# Patient Record
Sex: MALE | Race: WHITE | Employment: FULL TIME | URBAN - METROPOLITAN AREA
[De-identification: names, ages, dates, MRNs, and addresses within clinical notes are randomized per-mention and may not be internally consistent; named-entity substitution may affect disease eponyms.]

---

## 2024-04-22 ENCOUNTER — HOSPITAL ENCOUNTER (INPATIENT)
Age: 49
LOS: 3 days | Discharge: HOME | DRG: 062 | End: 2024-04-25
Attending: EMERGENCY MEDICINE | Admitting: PSYCHIATRY & NEUROLOGY
Payer: COMMERCIAL

## 2024-04-22 ENCOUNTER — APPOINTMENT (OUTPATIENT)
Dept: CT IMAGING | Age: 49
DRG: 062 | End: 2024-04-22
Payer: COMMERCIAL

## 2024-04-22 DIAGNOSIS — I63.9 ISCHEMIC STROKE (HCC): ICD-10-CM

## 2024-04-22 DIAGNOSIS — I63.9 CEREBROVASCULAR ACCIDENT (CVA), UNSPECIFIED MECHANISM (HCC): Primary | ICD-10-CM

## 2024-04-22 LAB
ANION GAP SERPL CALCULATED.3IONS-SCNC: 12 MMOL/L (ref 9–16)
BASOPHILS # BLD: 0.03 K/UL (ref 0–0.2)
BASOPHILS NFR BLD: 1 % (ref 0–2)
BUN BLD-MCNC: 18 MG/DL (ref 8–26)
BUN SERPL-MCNC: 18 MG/DL (ref 6–20)
CA-I BLD-SCNC: 1.22 MMOL/L (ref 1.15–1.33)
CALCIUM SERPL-MCNC: 9.2 MG/DL (ref 8.6–10.4)
CHLORIDE BLD-SCNC: 107 MMOL/L (ref 98–107)
CHLORIDE SERPL-SCNC: 106 MMOL/L (ref 98–107)
CK SERPL-CCNC: 130 U/L (ref 39–308)
CO2 BLD CALC-SCNC: 26 MMOL/L (ref 22–30)
CO2 SERPL-SCNC: 22 MMOL/L (ref 20–31)
CREAT SERPL-MCNC: 0.9 MG/DL (ref 0.7–1.2)
EGFR, POC: >90 ML/MIN/1.73M2
EOSINOPHIL # BLD: 0.11 K/UL (ref 0–0.44)
EOSINOPHILS RELATIVE PERCENT: 2 % (ref 1–4)
ERYTHROCYTE [DISTWIDTH] IN BLOOD BY AUTOMATED COUNT: 12.2 % (ref 11.8–14.4)
GFR SERPL CREATININE-BSD FRML MDRD: >90 ML/MIN/1.73M2
GLUCOSE BLD-MCNC: 94 MG/DL (ref 74–100)
GLUCOSE SERPL-MCNC: 100 MG/DL (ref 74–99)
HCO3 VENOUS: 26.5 MMOL/L (ref 22–29)
HCT VFR BLD AUTO: 42.3 % (ref 40.7–50.3)
HCT VFR BLD AUTO: 44 % (ref 41–53)
HGB BLD-MCNC: 14.5 G/DL (ref 13–17)
IMM GRANULOCYTES # BLD AUTO: <0.03 K/UL (ref 0–0.3)
IMM GRANULOCYTES NFR BLD: 0 %
INR PPP: 1
LYMPHOCYTES NFR BLD: 1.66 K/UL (ref 1.1–3.7)
LYMPHOCYTES RELATIVE PERCENT: 34 % (ref 24–43)
MCH RBC QN AUTO: 32.2 PG (ref 25.2–33.5)
MCHC RBC AUTO-ENTMCNC: 34.3 G/DL (ref 28.4–34.8)
MCV RBC AUTO: 93.8 FL (ref 82.6–102.9)
MONOCYTES NFR BLD: 0.46 K/UL (ref 0.1–1.2)
MONOCYTES NFR BLD: 9 % (ref 3–12)
MYOGLOBIN SERPL-MCNC: 35 NG/ML (ref 28–72)
NEUTROPHILS NFR BLD: 54 % (ref 36–65)
NEUTS SEG NFR BLD: 2.69 K/UL (ref 1.5–8.1)
NRBC BLD-RTO: 0 PER 100 WBC
O2 SAT, VEN: 51.6 % (ref 60–85)
PARTIAL THROMBOPLASTIN TIME: 24.6 SEC (ref 23–36.5)
PCO2, VEN: 45 MM HG (ref 41–51)
PH VENOUS: 7.38 (ref 7.32–7.43)
PLATELET # BLD AUTO: 201 K/UL (ref 138–453)
PMV BLD AUTO: 10.1 FL (ref 8.1–13.5)
PO2, VEN: 28.3 MM HG (ref 30–50)
POC ANION GAP: 11 MMOL/L (ref 7–16)
POC CREATININE: 0.8 MG/DL (ref 0.51–1.19)
POC HEMOGLOBIN (CALC): 14.9 G/DL (ref 13.5–17.5)
POC LACTIC ACID: 0.5 MMOL/L (ref 0.56–1.39)
POSITIVE BASE EXCESS, VEN: 0.9 MMOL/L (ref 0–3)
POTASSIUM BLD-SCNC: 3.8 MMOL/L (ref 3.5–4.5)
POTASSIUM SERPL-SCNC: 3.8 MMOL/L (ref 3.7–5.3)
PROTHROMBIN TIME: 13.1 SEC (ref 11.7–14.9)
RBC # BLD AUTO: 4.51 M/UL (ref 4.21–5.77)
SODIUM BLD-SCNC: 143 MMOL/L (ref 138–146)
SODIUM SERPL-SCNC: 140 MMOL/L (ref 136–145)
TROPONIN I SERPL HS-MCNC: <6 NG/L (ref 0–22)
WBC OTHER # BLD: 5 K/UL (ref 3.5–11.3)

## 2024-04-22 PROCEDURE — 83605 ASSAY OF LACTIC ACID: CPT

## 2024-04-22 PROCEDURE — 96374 THER/PROPH/DIAG INJ IV PUSH: CPT

## 2024-04-22 PROCEDURE — 70496 CT ANGIOGRAPHY HEAD: CPT

## 2024-04-22 PROCEDURE — 83874 ASSAY OF MYOGLOBIN: CPT

## 2024-04-22 PROCEDURE — 82947 ASSAY GLUCOSE BLOOD QUANT: CPT

## 2024-04-22 PROCEDURE — 82553 CREATINE MB FRACTION: CPT

## 2024-04-22 PROCEDURE — 85025 COMPLETE CBC W/AUTO DIFF WBC: CPT

## 2024-04-22 PROCEDURE — 99223 1ST HOSP IP/OBS HIGH 75: CPT | Performed by: PSYCHIATRY & NEUROLOGY

## 2024-04-22 PROCEDURE — 82565 ASSAY OF CREATININE: CPT

## 2024-04-22 PROCEDURE — 84520 ASSAY OF UREA NITROGEN: CPT

## 2024-04-22 PROCEDURE — 93005 ELECTROCARDIOGRAM TRACING: CPT

## 2024-04-22 PROCEDURE — 3E03317 INTRODUCTION OF OTHER THROMBOLYTIC INTO PERIPHERAL VEIN, PERCUTANEOUS APPROACH: ICD-10-PCS | Performed by: PSYCHIATRY & NEUROLOGY

## 2024-04-22 PROCEDURE — 70450 CT HEAD/BRAIN W/O DYE: CPT

## 2024-04-22 PROCEDURE — 85610 PROTHROMBIN TIME: CPT

## 2024-04-22 PROCEDURE — B24BZZ4 ULTRASONOGRAPHY OF HEART WITH AORTA, TRANSESOPHAGEAL: ICD-10-PCS | Performed by: INTERNAL MEDICINE

## 2024-04-22 PROCEDURE — 82330 ASSAY OF CALCIUM: CPT

## 2024-04-22 PROCEDURE — 82550 ASSAY OF CK (CPK): CPT

## 2024-04-22 PROCEDURE — 6370000000 HC RX 637 (ALT 250 FOR IP)

## 2024-04-22 PROCEDURE — 84484 ASSAY OF TROPONIN QUANT: CPT

## 2024-04-22 PROCEDURE — 2580000003 HC RX 258

## 2024-04-22 PROCEDURE — 80051 ELECTROLYTE PANEL: CPT

## 2024-04-22 PROCEDURE — 82803 BLOOD GASES ANY COMBINATION: CPT

## 2024-04-22 PROCEDURE — 6360000004 HC RX CONTRAST MEDICATION

## 2024-04-22 PROCEDURE — 85014 HEMATOCRIT: CPT

## 2024-04-22 PROCEDURE — 80048 BASIC METABOLIC PNL TOTAL CA: CPT

## 2024-04-22 PROCEDURE — 6360000002 HC RX W HCPCS

## 2024-04-22 PROCEDURE — 85730 THROMBOPLASTIN TIME PARTIAL: CPT

## 2024-04-22 PROCEDURE — 2000000000 HC ICU R&B

## 2024-04-22 PROCEDURE — 99285 EMERGENCY DEPT VISIT HI MDM: CPT

## 2024-04-22 RX ORDER — SODIUM CHLORIDE 0.9 % (FLUSH) 0.9 %
5-40 SYRINGE (ML) INJECTION PRN
Status: DISCONTINUED | OUTPATIENT
Start: 2024-04-22 | End: 2024-04-25 | Stop reason: HOSPADM

## 2024-04-22 RX ORDER — SODIUM CHLORIDE 0.9 % (FLUSH) 0.9 %
5-40 SYRINGE (ML) INJECTION EVERY 12 HOURS SCHEDULED
Status: DISCONTINUED | OUTPATIENT
Start: 2024-04-22 | End: 2024-04-25 | Stop reason: HOSPADM

## 2024-04-22 RX ORDER — SODIUM CHLORIDE 9 MG/ML
INJECTION, SOLUTION INTRAVENOUS PRN
Status: DISCONTINUED | OUTPATIENT
Start: 2024-04-22 | End: 2024-04-25 | Stop reason: HOSPADM

## 2024-04-22 RX ORDER — ROSUVASTATIN CALCIUM 20 MG/1
40 TABLET, COATED ORAL NIGHTLY
Status: DISCONTINUED | OUTPATIENT
Start: 2024-04-22 | End: 2024-04-25 | Stop reason: HOSPADM

## 2024-04-22 RX ORDER — SODIUM CHLORIDE 0.9 % (FLUSH) 0.9 %
10 SYRINGE (ML) INJECTION ONCE
Status: DISCONTINUED | OUTPATIENT
Start: 2024-04-22 | End: 2024-04-25 | Stop reason: HOSPADM

## 2024-04-22 RX ORDER — ONDANSETRON 2 MG/ML
4 INJECTION INTRAMUSCULAR; INTRAVENOUS EVERY 6 HOURS PRN
Status: DISCONTINUED | OUTPATIENT
Start: 2024-04-22 | End: 2024-04-25 | Stop reason: HOSPADM

## 2024-04-22 RX ORDER — ENOXAPARIN SODIUM 100 MG/ML
30 INJECTION SUBCUTANEOUS 2 TIMES DAILY
Status: DISCONTINUED | OUTPATIENT
Start: 2024-04-23 | End: 2024-04-25 | Stop reason: HOSPADM

## 2024-04-22 RX ORDER — ASPIRIN 300 MG/1
300 SUPPOSITORY RECTAL DAILY
Status: DISCONTINUED | OUTPATIENT
Start: 2024-04-23 | End: 2024-04-24

## 2024-04-22 RX ORDER — POLYETHYLENE GLYCOL 3350 17 G/17G
17 POWDER, FOR SOLUTION ORAL DAILY PRN
Status: DISCONTINUED | OUTPATIENT
Start: 2024-04-22 | End: 2024-04-25 | Stop reason: HOSPADM

## 2024-04-22 RX ORDER — ASPIRIN 81 MG/1
81 TABLET, CHEWABLE ORAL DAILY
Status: DISCONTINUED | OUTPATIENT
Start: 2024-04-23 | End: 2024-04-24

## 2024-04-22 RX ORDER — ONDANSETRON 4 MG/1
4 TABLET, ORALLY DISINTEGRATING ORAL EVERY 8 HOURS PRN
Status: DISCONTINUED | OUTPATIENT
Start: 2024-04-22 | End: 2024-04-25 | Stop reason: HOSPADM

## 2024-04-22 RX ADMIN — IOPAMIDOL 90 ML: 755 INJECTION, SOLUTION INTRAVENOUS at 17:36

## 2024-04-22 RX ADMIN — Medication 25 MG: at 17:44

## 2024-04-22 RX ADMIN — SODIUM CHLORIDE, PRESERVATIVE FREE 5 ML: 5 INJECTION INTRAVENOUS at 23:11

## 2024-04-22 RX ADMIN — ROSUVASTATIN CALCIUM 40 MG: 20 TABLET, FILM COATED ORAL at 23:22

## 2024-04-22 ASSESSMENT — PAIN - FUNCTIONAL ASSESSMENT: PAIN_FUNCTIONAL_ASSESSMENT: NONE - DENIES PAIN

## 2024-04-22 NOTE — ED NOTES
Pt arrives to ED 22 via EMS.  EMS called with a RACE score of 6.   Pt states he was at the gas station, when he began to feel weird and weak on the R side.  Pt states his coworkers caught him, so he did not fall.   Pt denies being on any blood thinners.  Denies any pain or any other complaints at this time.  Pt straight to the CT scanner on arrival.

## 2024-04-22 NOTE — ED PROVIDER NOTES
[facility]     Emergency Department     Faculty Attestation    I performed a history and physical examination of the patient and discussed management with the resident. I reviewed the resident’s note and agree with the documented findings and plan of care. Any areas of disagreement are noted on the chart. I was personally present for the key portions of any procedures. I have documented in the chart those procedures where I was not present during the key portions. I have reviewed the emergency nurses triage note. I agree with the chief complaint, past medical history, past surgical history, allergies, medications, social and family history as documented unless otherwise noted below.        For Physician Assistant/ Nurse Practitioner cases/documentation I have personally evaluated this patient and have completed at least one if not all key elements of the E/M (history, physical exam, and MDM). Additional findings are as noted.  I have personally seen and evaluated the patient.  I find the patient's history and physical exam are consistent with the NP/PA documentation.  I agree with the care provided, treatment rendered, disposition and follow-up plan.    Arrives by EMS last known well 20 minutes prior to arrival here acute onset of right-sided weakness of arm and leg.  Patient has a family history of strokes he himself has no medical issues or problems or complaints.,,  Blood pressure in the field was 142 systolic blood sugar normal    Stroke alert critical called upon arrival stroke team arrives this patient is led to the CT scanner immediately from the EMS back          Critical Care     Carlos Alberto Moreno M.D.  Attending Emergency  Physician            Carlos Alberto Moreno MD  04/22/24 [id number]

## 2024-04-22 NOTE — ED PROVIDER NOTES
[facility]  Emergency Department Encounter  Emergency Medicine Resident     Pt Name:Jordi Lott  MRN: [id number]  Birthdate 1975  Date of evaluation: 4/22/24  PCP:  Gerry Sarabia DO  Note Started: 5:30 PM EDT      CHIEF COMPLAINT       Chief Complaint   Patient presents with    Cerebrovascular Accident       HISTORY OF PRESENT ILLNESS  (Location/Symptom, Timing/Onset, Context/Setting, Quality, Duration, Modifying Factors, Severity.)      Jordi Lott is a 48 y.o. male who presents with right-sided deficits that began just prior to arrival.  Patient presents as a race alert from EMS.  Patient was out with friends when they noticed the patient began having right-sided deficits including facial droop, right arm weakness, right lower extremity weakness.  EMS was called.  Patient also has speech deficits.  Patient voices no significant past medical history.  Difficulty obtaining history due to patient's speech      PAST MEDICAL / SURGICAL / SOCIAL / FAMILY HISTORY      has no past medical history on file.       has no past surgical history on file.      Social History     Socioeconomic History    Marital status:      Spouse name: Not on file    Number of children: Not on file    Years of education: Not on file    Highest education level: Not on file   Occupational History    Not on file   Tobacco Use    Smoking status: Not on file    Smokeless tobacco: Not on file   Substance and Sexual Activity    Alcohol use: Not on file    Drug use: Not on file    Sexual activity: Not on file   Other Topics Concern    Not on file   Social History Narrative    Not on file     Social Determinants of Health     Financial Resource Strain: Not on file   Food Insecurity: No Food Insecurity (4/23/2024)    Hunger Vital Sign     Worried About Running Out of Food in the Last Year: Never true     Ran Out of Food in the Last Year: Never true   Transportation Needs: No Transportation Needs (4/23/2024)    PRAPARE - Transportation

## 2024-04-23 ENCOUNTER — APPOINTMENT (OUTPATIENT)
Age: 49
DRG: 062 | End: 2024-04-23
Attending: INTERNAL MEDICINE
Payer: COMMERCIAL

## 2024-04-23 ENCOUNTER — APPOINTMENT (OUTPATIENT)
Dept: CT IMAGING | Age: 49
DRG: 062 | End: 2024-04-23
Payer: COMMERCIAL

## 2024-04-23 ENCOUNTER — APPOINTMENT (OUTPATIENT)
Dept: MRI IMAGING | Age: 49
DRG: 062 | End: 2024-04-23
Payer: COMMERCIAL

## 2024-04-23 PROBLEM — G81.91 ACUTE RIGHT HEMIPARESIS (HCC): Status: ACTIVE | Noted: 2024-04-23

## 2024-04-23 PROBLEM — I63.512 ACUTE ISCHEMIC LEFT MIDDLE CEREBRAL ARTERY (MCA) STROKE (HCC): Status: ACTIVE | Noted: 2024-04-22

## 2024-04-23 LAB
AMPHET UR QL SCN: NEGATIVE
ANION GAP SERPL CALCULATED.3IONS-SCNC: 10 MMOL/L (ref 9–16)
BARBITURATES UR QL SCN: NEGATIVE
BENZODIAZ UR QL: NEGATIVE
BUN SERPL-MCNC: 15 MG/DL (ref 6–20)
CALCIUM SERPL-MCNC: 8.6 MG/DL (ref 8.6–10.4)
CANNABINOIDS UR QL SCN: NEGATIVE
CHLORIDE SERPL-SCNC: 109 MMOL/L (ref 98–107)
CHOLEST SERPL-MCNC: 155 MG/DL (ref 0–199)
CHOLESTEROL/HDL RATIO: 4
CO2 SERPL-SCNC: 21 MMOL/L (ref 20–31)
COCAINE UR QL SCN: NEGATIVE
CREAT SERPL-MCNC: 0.8 MG/DL (ref 0.7–1.2)
EKG ATRIAL RATE: 65 BPM
EKG P AXIS: 19 DEGREES
EKG P-R INTERVAL: 176 MS
EKG Q-T INTERVAL: 414 MS
EKG QRS DURATION: 94 MS
EKG QTC CALCULATION (BAZETT): 430 MS
EKG R AXIS: -27 DEGREES
EKG T AXIS: 5 DEGREES
EKG VENTRICULAR RATE: 65 BPM
ERYTHROCYTE [DISTWIDTH] IN BLOOD BY AUTOMATED COUNT: 12.2 % (ref 11.8–14.4)
EST. AVERAGE GLUCOSE BLD GHB EST-MCNC: 94 MG/DL
FENTANYL UR QL: NEGATIVE
GFR SERPL CREATININE-BSD FRML MDRD: >90 ML/MIN/1.73M2
GLUCOSE SERPL-MCNC: 111 MG/DL (ref 74–99)
HBA1C MFR BLD: 4.9 % (ref 4–6)
HCT VFR BLD AUTO: 41.4 % (ref 40.7–50.3)
HDLC SERPL-MCNC: 44 MG/DL
HGB BLD-MCNC: 14.2 G/DL (ref 13–17)
LDLC SERPL CALC-MCNC: 95 MG/DL (ref 0–100)
MCH RBC QN AUTO: 32 PG (ref 25.2–33.5)
MCHC RBC AUTO-ENTMCNC: 34.3 G/DL (ref 28.4–34.8)
MCV RBC AUTO: 93.2 FL (ref 82.6–102.9)
METHADONE UR QL: NEGATIVE
NRBC BLD-RTO: 0 PER 100 WBC
OPIATES UR QL SCN: NEGATIVE
OXYCODONE UR QL SCN: NEGATIVE
PCP UR QL SCN: NEGATIVE
PLATELET # BLD AUTO: 150 K/UL (ref 138–453)
PMV BLD AUTO: 10 FL (ref 8.1–13.5)
POTASSIUM SERPL-SCNC: 3.9 MMOL/L (ref 3.7–5.3)
RBC # BLD AUTO: 4.44 M/UL (ref 4.21–5.77)
SODIUM SERPL-SCNC: 140 MMOL/L (ref 136–145)
TEST INFORMATION: NORMAL
TRIGL SERPL-MCNC: 83 MG/DL
VLDLC SERPL CALC-MCNC: 17 MG/DL
WBC OTHER # BLD: 4.2 K/UL (ref 3.5–11.3)

## 2024-04-23 PROCEDURE — 85385 FIBRINOGEN ANTIGEN: CPT

## 2024-04-23 PROCEDURE — 83036 HEMOGLOBIN GLYCOSYLATED A1C: CPT

## 2024-04-23 PROCEDURE — 70551 MRI BRAIN STEM W/O DYE: CPT

## 2024-04-23 PROCEDURE — 86147 CARDIOLIPIN ANTIBODY EA IG: CPT

## 2024-04-23 PROCEDURE — 85302 CLOT INHIBIT PROT C ANTIGEN: CPT

## 2024-04-23 PROCEDURE — 80307 DRUG TEST PRSMV CHEM ANLYZR: CPT

## 2024-04-23 PROCEDURE — 36415 COLL VENOUS BLD VENIPUNCTURE: CPT

## 2024-04-23 PROCEDURE — 97165 OT EVAL LOW COMPLEX 30 MIN: CPT

## 2024-04-23 PROCEDURE — 80061 LIPID PANEL: CPT

## 2024-04-23 PROCEDURE — 2580000003 HC RX 258

## 2024-04-23 PROCEDURE — 86148 ANTI-PHOSPHOLIPID ANTIBODY: CPT

## 2024-04-23 PROCEDURE — 2060000000 HC ICU INTERMEDIATE R&B

## 2024-04-23 PROCEDURE — 85240 CLOT FACTOR VIII AHG 1 STAGE: CPT

## 2024-04-23 PROCEDURE — 86140 C-REACTIVE PROTEIN: CPT

## 2024-04-23 PROCEDURE — 86146 BETA-2 GLYCOPROTEIN ANTIBODY: CPT

## 2024-04-23 PROCEDURE — 83695 ASSAY OF LIPOPROTEIN(A): CPT

## 2024-04-23 PROCEDURE — 85230 CLOT FACTOR VII PROCONVERTIN: CPT

## 2024-04-23 PROCEDURE — 97162 PT EVAL MOD COMPLEX 30 MIN: CPT

## 2024-04-23 PROCEDURE — 83090 ASSAY OF HOMOCYSTEINE: CPT

## 2024-04-23 PROCEDURE — 85384 FIBRINOGEN ACTIVITY: CPT

## 2024-04-23 PROCEDURE — 85300 ANTITHROMBIN III ACTIVITY: CPT

## 2024-04-23 PROCEDURE — 85027 COMPLETE CBC AUTOMATED: CPT

## 2024-04-23 PROCEDURE — 70450 CT HEAD/BRAIN W/O DYE: CPT

## 2024-04-23 PROCEDURE — 85610 PROTHROMBIN TIME: CPT

## 2024-04-23 PROCEDURE — 81291 MTHFR GENE: CPT

## 2024-04-23 PROCEDURE — 85613 RUSSELL VIPER VENOM DILUTED: CPT

## 2024-04-23 PROCEDURE — 97116 GAIT TRAINING THERAPY: CPT

## 2024-04-23 PROCEDURE — 85306 CLOT INHIBIT PROT S FREE: CPT

## 2024-04-23 PROCEDURE — 97535 SELF CARE MNGMENT TRAINING: CPT

## 2024-04-23 PROCEDURE — 81241 F5 GENE: CPT

## 2024-04-23 PROCEDURE — 80048 BASIC METABOLIC PNL TOTAL CA: CPT

## 2024-04-23 PROCEDURE — 6370000000 HC RX 637 (ALT 250 FOR IP)

## 2024-04-23 PROCEDURE — 81240 F2 GENE: CPT

## 2024-04-23 PROCEDURE — 93010 ELECTROCARDIOGRAM REPORT: CPT | Performed by: INTERNAL MEDICINE

## 2024-04-23 PROCEDURE — 85730 THROMBOPLASTIN TIME PARTIAL: CPT

## 2024-04-23 PROCEDURE — 85307 ASSAY ACTIVATED PROTEIN C: CPT

## 2024-04-23 RX ORDER — TRAZODONE HYDROCHLORIDE 100 MG/1
100 TABLET ORAL NIGHTLY
COMMUNITY
Start: 2018-10-19

## 2024-04-23 RX ORDER — TRAZODONE HYDROCHLORIDE 100 MG/1
100 TABLET ORAL NIGHTLY
Status: DISCONTINUED | OUTPATIENT
Start: 2024-04-23 | End: 2024-04-25 | Stop reason: HOSPADM

## 2024-04-23 RX ADMIN — SODIUM CHLORIDE, PRESERVATIVE FREE 10 ML: 5 INJECTION INTRAVENOUS at 20:28

## 2024-04-23 RX ADMIN — SODIUM CHLORIDE, PRESERVATIVE FREE 10 ML: 5 INJECTION INTRAVENOUS at 09:10

## 2024-04-23 RX ADMIN — TRAZODONE HYDROCHLORIDE 100 MG: 100 TABLET ORAL at 22:00

## 2024-04-23 RX ADMIN — ROSUVASTATIN CALCIUM 40 MG: 20 TABLET, FILM COATED ORAL at 20:28

## 2024-04-23 RX ADMIN — SODIUM CHLORIDE, PRESERVATIVE FREE 10 ML: 5 INJECTION INTRAVENOUS at 09:09

## 2024-04-23 ASSESSMENT — ENCOUNTER SYMPTOMS
BACK PAIN: 0
SHORTNESS OF BREATH: 0
ABDOMINAL PAIN: 0
NAUSEA: 0

## 2024-04-23 NOTE — ED NOTES
Report given to BERNARD Martin  All questions answered  Pt to floor via stretcher on RA and full cardiac monitor with RN

## 2024-04-23 NOTE — ED NOTES
Admitting service perfect served about bradycardic when pt asleep. Neuro resident forward message to crit care neuro team

## 2024-04-23 NOTE — ED NOTES
ED to inpatient nurses report      Chief Complaint:  Chief Complaint   Patient presents with    Cerebrovascular Accident     Present to ED from: Home    MOA:     LOC: alert and orientated to name, place, date  Mobility: Requires assistance * 1  Oxygen Baseline: 98%    Current needs required: None   Pending ED orders: None  Present condition: A&O x 4    Why did the patient come to the ED? Pt arrives to ED 22 via EMS.  EMS called with a RACE score of 6.   Pt states he was at the gas station, when he began to feel weird and weak on the R side.  Pt states his coworkers caught him, so he did not fall.   Pt denies being on any blood thinners.  Denies any pain or any other complaints at this time.  Pt straight to the CT scanner on arrival.  What is the plan? Monitor after TNK  Any procedures or intervention occur? CT, TNK, labs  Any safety concerns: Risk for bleeding    Mental Status:  Level of Consciousness: Alert (0)    Psych Assessment:   Psychosocial  Psychosocial (WDL): Within Defined Limits  Vital signs   Vitals:    04/23/24 0212 04/23/24 0231 04/23/24 0245 04/23/24 0247   BP: (!) 115/52 131/89 (!) 142/91    Pulse:    (!) 45   Resp:  14 14    Temp:       TempSrc:       SpO2: 93% 95% 96%    Weight:       Height:            Vitals:  Patient Vitals for the past 24 hrs:   BP Temp Temp src Pulse Resp SpO2 Height Weight   04/23/24 0247 -- -- -- (!) 45 -- -- -- --   04/23/24 0245 (!) 142/91 -- -- -- 14 96 % -- --   04/23/24 0231 131/89 -- -- -- 14 95 % -- --   04/23/24 0212 (!) 115/52 -- -- -- -- 93 % -- --   04/23/24 0157 107/70 -- -- -- -- 97 % -- --   04/23/24 0130 -- -- -- 54 15 95 % -- --   04/23/24 0127 124/85 -- -- (!) 44 15 94 % -- --   04/23/24 0045 139/89 -- -- -- -- 93 % -- --   04/23/24 0030 (!) 128/97 -- -- 51 -- 96 % -- --   04/23/24 0011 124/87 -- -- -- -- 95 % -- --   04/23/24 0000 123/76 -- -- 52 -- 94 % -- --   04/22/24 2345 116/83 -- -- 52 21 96 % -- --   04/22/24 2330 119/77 -- -- 65 19 96 % -- --

## 2024-04-23 NOTE — ED NOTES
Pt resting on cot.  Family at bedside.  Pt denies any needs at this time.  Pt respirations are even and unlabored.  Pt is alert and oriented X 4.  Pt is speaking in complete sentences.   Bed is in the lowest position.   Call light is within reach.    Will continue to monitor.

## 2024-04-23 NOTE — CARE COORDINATION
PHQ-9  Met with pt with wife present with consent.  Pt lives with his wife. Has 1 stepson and 2 step dtrs. Also has a sister that is supportive.  Pt has no SI or feelings of depression.    Patient Health Questionnaire-9 (PHQ-9)    Over the last 2 weeks, how often have you been bothered by any of the following problems?    1. Little Interest or pleasure in doing things?   [x] Not at all  [] Several Days  [] More than half the day  []  Nearly every day    2. Feeling down, depressed or hopeless?    [x] Not at all  [] Several Days  [] More than half the day  []  Nearly every day    3. Trouble falling or staying asleep, or sleeping too much?   [x] Not at all  [] Several Days  [] More than half the day  []  Nearly every day    4. Feeling tired or having little energy?   [x] Not at all  [] Several Days  [] More than half the day  []  Nearly every day    5. Poor apettite or overeating?   [x] Not at all  [] Several Days  [] More than half the day  []  Nearly every day    6. Feeling bad about yourself-or that you are a failure or have let yourself or your family down?   [x] Not at all  [] Several Days  [] More than half the day  []  Nearly every day    7. Trouble concentrating on things, such as reading the newspaper or watching television?   [x] Not at all  [] Several Days  [] More than half the day  []  Nearly every day    8. Moving or speaking so slowly that other people could have noticed? Or the opposite-being so fidgety or restless that you have been moving around a lot more than usual?   [x] Not at all  [] Several Days  [] More than half the day  []  Nearly every day    9. Thoughts that you would be better off dead or of hurting yourself in some way?   [x] Not at all  [] Several Days  [] More than half the day  []  Nearly every day    Total Score: 0    If you checked off any problems, how difficult have these problems made it for you to do your work, take care of things at home, or get along with other people?   [x]

## 2024-04-24 ENCOUNTER — APPOINTMENT (OUTPATIENT)
Dept: VASCULAR LAB | Age: 49
DRG: 062 | End: 2024-04-24
Payer: COMMERCIAL

## 2024-04-24 ENCOUNTER — APPOINTMENT (OUTPATIENT)
Age: 49
DRG: 062 | End: 2024-04-24
Attending: INTERNAL MEDICINE
Payer: COMMERCIAL

## 2024-04-24 LAB
AT III ACT/NOR PPP CHRO: 90 % (ref 83–122)
CRP SERPL HS-MCNC: <3 MG/L (ref 0–5)
ECHO BSA: 2.36 M2
FACTOR VII ACTIVITY: 89 % (ref 50–150)
FACTOR VIII ACTIVITY: 105 % (ref 50–150)
HCYS SERPL-SCNC: 9.2 UMOL/L (ref 0–15)

## 2024-04-24 PROCEDURE — C1892 INTRO/SHEATH,FIXED,PEEL-AWAY: HCPCS | Performed by: INTERNAL MEDICINE

## 2024-04-24 PROCEDURE — 99222 1ST HOSP IP/OBS MODERATE 55: CPT | Performed by: INTERNAL MEDICINE

## 2024-04-24 PROCEDURE — 6360000002 HC RX W HCPCS: Performed by: INTERNAL MEDICINE

## 2024-04-24 PROCEDURE — 6370000000 HC RX 637 (ALT 250 FOR IP)

## 2024-04-24 PROCEDURE — 99152 MOD SED SAME PHYS/QHP 5/>YRS: CPT | Performed by: INTERNAL MEDICINE

## 2024-04-24 PROCEDURE — 93970 EXTREMITY STUDY: CPT | Performed by: STUDENT IN AN ORGANIZED HEALTH CARE EDUCATION/TRAINING PROGRAM

## 2024-04-24 PROCEDURE — 93320 DOPPLER ECHO COMPLETE: CPT | Performed by: INTERNAL MEDICINE

## 2024-04-24 PROCEDURE — 93970 EXTREMITY STUDY: CPT

## 2024-04-24 PROCEDURE — 6370000000 HC RX 637 (ALT 250 FOR IP): Performed by: STUDENT IN AN ORGANIZED HEALTH CARE EDUCATION/TRAINING PROGRAM

## 2024-04-24 PROCEDURE — 92523 SPEECH SOUND LANG COMPREHEN: CPT

## 2024-04-24 PROCEDURE — C1764 EVENT RECORDER, CARDIAC: HCPCS | Performed by: INTERNAL MEDICINE

## 2024-04-24 PROCEDURE — 33285 INSJ SUBQ CAR RHYTHM MNTR: CPT | Performed by: INTERNAL MEDICINE

## 2024-04-24 PROCEDURE — 2580000003 HC RX 258

## 2024-04-24 PROCEDURE — 93312 ECHO TRANSESOPHAGEAL: CPT | Performed by: INTERNAL MEDICINE

## 2024-04-24 PROCEDURE — 93325 DOPPLER ECHO COLOR FLOW MAPG: CPT | Performed by: INTERNAL MEDICINE

## 2024-04-24 PROCEDURE — 6370000000 HC RX 637 (ALT 250 FOR IP): Performed by: INTERNAL MEDICINE

## 2024-04-24 PROCEDURE — 2060000000 HC ICU INTERMEDIATE R&B

## 2024-04-24 PROCEDURE — 2709999900 HC NON-CHARGEABLE SUPPLY: Performed by: INTERNAL MEDICINE

## 2024-04-24 PROCEDURE — 93312 ECHO TRANSESOPHAGEAL: CPT

## 2024-04-24 PROCEDURE — 99233 SBSQ HOSP IP/OBS HIGH 50: CPT | Performed by: PSYCHIATRY & NEUROLOGY

## 2024-04-24 DEVICE — ICM LNQ22 LINQ II USA
Type: IMPLANTABLE DEVICE | Status: FUNCTIONAL
Brand: LINQ II™

## 2024-04-24 RX ORDER — ASPIRIN 81 MG/1
81 TABLET ORAL DAILY
Status: DISCONTINUED | OUTPATIENT
Start: 2024-04-24 | End: 2024-04-25 | Stop reason: HOSPADM

## 2024-04-24 RX ORDER — MIDAZOLAM HYDROCHLORIDE 1 MG/ML
INJECTION INTRAMUSCULAR; INTRAVENOUS PRN
Status: DISCONTINUED | OUTPATIENT
Start: 2024-04-24 | End: 2024-04-24 | Stop reason: HOSPADM

## 2024-04-24 RX ORDER — LIDOCAINE HYDROCHLORIDE 20 MG/ML
SOLUTION OROPHARYNGEAL PRN
Status: DISCONTINUED | OUTPATIENT
Start: 2024-04-24 | End: 2024-04-24 | Stop reason: HOSPADM

## 2024-04-24 RX ORDER — FENTANYL CITRATE 50 UG/ML
INJECTION, SOLUTION INTRAMUSCULAR; INTRAVENOUS PRN
Status: DISCONTINUED | OUTPATIENT
Start: 2024-04-24 | End: 2024-04-24 | Stop reason: HOSPADM

## 2024-04-24 RX ORDER — SODIUM CHLORIDE 9 MG/ML
INJECTION, SOLUTION INTRAVENOUS CONTINUOUS
Status: DISCONTINUED | OUTPATIENT
Start: 2024-04-24 | End: 2024-04-25 | Stop reason: HOSPADM

## 2024-04-24 RX ADMIN — ASPIRIN 81 MG: 81 TABLET, COATED ORAL at 11:56

## 2024-04-24 RX ADMIN — SODIUM CHLORIDE, PRESERVATIVE FREE 10 ML: 5 INJECTION INTRAVENOUS at 21:29

## 2024-04-24 RX ADMIN — TRAZODONE HYDROCHLORIDE 100 MG: 100 TABLET ORAL at 21:32

## 2024-04-24 RX ADMIN — ROSUVASTATIN CALCIUM 40 MG: 20 TABLET, FILM COATED ORAL at 21:29

## 2024-04-24 ASSESSMENT — ENCOUNTER SYMPTOMS
ABDOMINAL PAIN: 0
NAUSEA: 0
COUGH: 0
VOMITING: 0
SORE THROAT: 0
PHOTOPHOBIA: 0
SHORTNESS OF BREATH: 0
COLOR CHANGE: 0

## 2024-04-24 NOTE — H&P
limb holds 90 (or 45) degrees for full 10 seconds  6a.  Motor left le - no drift; leg holds 30 degree position for full 5 seconds  6b.  Motor right le - no drift; leg holds 30 degree position for full 5 seconds  7.    Limb Ataxia:  0 - absent  8.    Sensory:  0 - normal; no sensory loss  9.    Best Language:  0 - no aphasia, normal  10.  Dysarthria:  0 - normal  11.  Extinction and Inattention:  0 - no abnormality    LABS AND IMAGING:     RECENT LABS:  CBC with Differential:    Lab Results   Component Value Date/Time    WBC 5.0 2024 05:59 PM    RBC 4.51 2024 05:59 PM    HGB 14.5 2024 05:59 PM    HCT 42.3 2024 05:59 PM     2024 05:59 PM    MCV 93.8 2024 05:59 PM    MCH 32.2 2024 05:59 PM    MCHC 34.3 2024 05:59 PM    RDW 12.2 2024 05:59 PM    LYMPHOPCT 34 2024 05:59 PM    MONOPCT 9 2024 05:59 PM    BASOPCT 1 2024 05:59 PM    MONOSABS 0.46 2024 05:59 PM    LYMPHSABS 1.66 2024 05:59 PM    EOSABS 0.11 2024 05:59 PM    BASOSABS 0.03 2024 05:59 PM     BMP:    Lab Results   Component Value Date/Time     2024 05:59 PM    K 3.8 2024 05:59 PM     2024 05:59 PM    CO2 22 2024 05:59 PM    BUN 18 2024 05:59 PM    CREATININE 0.9 2024 05:59 PM    CREATININE 0.8 2024 05:30 PM    CALCIUM 9.2 2024 05:59 PM    LABGLOM >90 2024 05:59 PM    GLUCOSE 100 2024 05:59 PM       RADIOLOGY:     CTA head and neck:Impression: Calcified plaque origin right vertebral artery which is otherwise normal. Otherwise unremarkable CTA of the head and neck.     CT head without contrast: Impression: No acute intracranial abnormality.     Labs and Images reviewed with:    [x] CHIDI FERRARA MD    [] Serena Hughes MD  [] Kel Wilson MD  --[] there are no new interval images to review.     ASSESSMENT AND PLAN:         ASSESSMENT:     This is a 48 y.o. male with 
with attending  The plan was discussed with the patient, patient's family and the medical staff.     Patient is admitted as inpatient status because of co-morbidities listed above, severity of signs and symptoms as outlined, requirement for current medical therapies and most importantly because of direct risk to patient if care not provided in a hospital setting.    Vito Horowitz  [facility], [facility]  4/24/2024  8:40 AM

## 2024-04-24 NOTE — PROCEDURES
SHAUNA/Cardioversion Note          Date of the procedure: 4/24/24     Indication: cryptogenic CVA     : Katlyn Carreon MD     Patient seen and examined. History and Physical reviewed. Labs reviewed.    The informed consent was obtained with explanation of the risks and benefits.     All sedation was administered by the cardiologist.     The oropharynx was pre anesthetisized with cetacaine spray.    SHAUNA Findings:    Structures:  LA: Normal  GUERO: No thrombus  RA: Normal  RV:  Normal  LV: Normal in size with normal systolic function   Estimated LVEF: 55%  Aorta: Mild atheromatous disease arch  Aortic root is moderately dilated at 4.6 cm.   Percardium: No pericardial effusion  Septum: No intracardiac shunt via color Doppler. Right to eft shunt noted via injection of agitated saline (positive bubbles study) suggestive of a large PFO or ASD.     Valves:  Mitral Valve: Structurally normal. No regurgitation is identified. No obvious vegetations or thrombus seen.   Aortic Valve: The aortic valve is trileaflet and opens adequately. Mild regurgitiation is identified. No obvious vegetations or thrombus seen.   Tricuspid valve: Structurally normal. No regurgitation is identified. No obvious vegetations or thrombus seen.   Pulmonary valve: Normal. No significant regurgitation. No obvious vegetations or thrombus seen.       Impression:     1. A SHAUNA was performed without complications.  2. Normal Lv size with normal systolic function. Estimated LVEF 55%  3. No GUERO thrombus or valvular vegetation identified  4. Inter-atrial shunt noted via injection of agitated saline suggesting a large PFO or an ASD (positive bubble study)  5. Moderately dilated aortic root measuring 4.6 cm at sinus of valsalva.        Plan:     Consider DAPT  Patient might benefit from Percutaneous PFO closure.   He is

## 2024-04-25 VITALS
BODY MASS INDEX: 35.5 KG/M2 | HEIGHT: 70 IN | HEART RATE: 59 BPM | OXYGEN SATURATION: 96 % | TEMPERATURE: 98.8 F | RESPIRATION RATE: 20 BRPM | SYSTOLIC BLOOD PRESSURE: 101 MMHG | DIASTOLIC BLOOD PRESSURE: 66 MMHG | WEIGHT: 248 LBS

## 2024-04-25 LAB
ECHO AO ROOT DIAM: 4.6 CM
ECHO AO ROOT INDEX: 2.01 CM/M2
ECHO BSA: 2.36 M2
ECHO BSA: 2.37 M2
LPA SERPL-MCNC: 16 MG/DL
PROT C AG ACT/NOR PPP IA: 72 % (ref 63–153)

## 2024-04-25 PROCEDURE — 97129 THER IVNTJ 1ST 15 MIN: CPT

## 2024-04-25 PROCEDURE — 2580000003 HC RX 258

## 2024-04-25 PROCEDURE — 99232 SBSQ HOSP IP/OBS MODERATE 35: CPT | Performed by: PSYCHIATRY & NEUROLOGY

## 2024-04-25 PROCEDURE — 6370000000 HC RX 637 (ALT 250 FOR IP): Performed by: STUDENT IN AN ORGANIZED HEALTH CARE EDUCATION/TRAINING PROGRAM

## 2024-04-25 PROCEDURE — 97130 THER IVNTJ EA ADDL 15 MIN: CPT

## 2024-04-25 RX ORDER — CLOPIDOGREL BISULFATE 75 MG/1
75 TABLET ORAL DAILY
Qty: 30 TABLET | Refills: 3 | Status: SHIPPED | OUTPATIENT
Start: 2024-04-25

## 2024-04-25 RX ORDER — ASPIRIN 81 MG/1
81 TABLET ORAL DAILY
Qty: 30 TABLET | Refills: 3 | Status: SHIPPED | OUTPATIENT
Start: 2024-04-26

## 2024-04-25 RX ORDER — ROSUVASTATIN CALCIUM 40 MG/1
40 TABLET, COATED ORAL NIGHTLY
Qty: 30 TABLET | Refills: 3 | Status: SHIPPED | OUTPATIENT
Start: 2024-04-25

## 2024-04-25 RX ORDER — CLOPIDOGREL BISULFATE 75 MG/1
75 TABLET ORAL DAILY
Status: DISCONTINUED | OUTPATIENT
Start: 2024-04-25 | End: 2024-04-25 | Stop reason: HOSPADM

## 2024-04-25 RX ADMIN — CLOPIDOGREL BISULFATE 75 MG: 75 TABLET ORAL at 12:07

## 2024-04-25 RX ADMIN — ASPIRIN 81 MG: 81 TABLET, COATED ORAL at 08:08

## 2024-04-25 RX ADMIN — SODIUM CHLORIDE, PRESERVATIVE FREE 10 ML: 5 INJECTION INTRAVENOUS at 08:09

## 2024-04-25 ASSESSMENT — ENCOUNTER SYMPTOMS
COLOR CHANGE: 0
SHORTNESS OF BREATH: 0
VOMITING: 0
SORE THROAT: 0
COUGH: 0
ABDOMINAL PAIN: 0
NAUSEA: 0
PHOTOPHOBIA: 0

## 2024-04-25 NOTE — CONSULTS
Endovascular Neurosurgery Consult      Reason for evaluation: stroke management    SUBJECTIVE:   History of Chief Complaint:    48 year man who was brought to ED by EMS due to sudden onset right-sided weakness on 4/23/24.  Patient was not seen by station when he suddenly felt dizzy and weak on the right side, he did not fall because his coworker caught him.      CTA is negative for LVO. TNK was administered at 5:45 pm and patient was then admitted to the neuro ICU for further monitoring and post TNK management. On arrival to the neuro ICU patient was noted to have completed resolution of symptoms, NIH 0 on re-examination.     SHAUNA found to have PFO, endovascular consulted for stroke recommendation.    Allergies  has No Known Allergies.  Medications  Prior to Admission medications    Medication Sig Start Date End Date Taking? Authorizing Provider   traZODone (DESYREL) 100 MG tablet Take 1 tablet by mouth nightly 10/19/18  Yes Provider, MD Abi    Scheduled Meds:   aspirin  81 mg Oral Daily    traZODone  100 mg Oral Nightly    sodium chloride flush  5-40 mL IntraVENous 2 times per day    sodium chloride flush  10 mL IntraVENous Once    Followed by    sodium chloride flush  10 mL IntraVENous Once    sodium chloride flush  5-40 mL IntraVENous 2 times per day    [Held by provider] enoxaparin  30 mg SubCUTAneous BID    rosuvastatin  40 mg Oral Nightly     Continuous Infusions:   sodium chloride      sodium chloride      sodium chloride       PRN Meds:.sodium chloride flush, sodium chloride, dextrose bolus, sodium chloride flush, sodium chloride, ondansetron **OR** ondansetron, polyethylene glycol  Past Medical History   has no past medical history on file.  Past Surgical History   has no past surgical history on file.  Social History   has no history on file for tobacco use.   has no history on file for alcohol use.   has no history on file for drug use.  Family History  family history is not on file.    Review 
nodes.  Allergic/Immunologic: No nasal congestion or hives.    PHYSICAL EXAM:    Physical Examination:    /74   Pulse (!) 47   Temp 98.2 °F (36.8 °C) (Oral)   Resp 18   Ht 1.778 m (5' 10\")   Wt 112.6 kg (248 lb 3.8 oz)   SpO2 95%   BMI 35.62 kg/m²      Constitutional and General Appearance: alert, cooperative, in no distress   HEENT: PERRL, no cervical lymphadenopathy. Normal oral mucosa  Respiratory:  Normal excursion and expansion without use of accessory muscles  Resp Auscultation: Good respiratory effort. No for increased work of breathing. On auscultation: clear to auscultation bilaterally, no wheezing, no rales.   Cardiovascular:  The apical impulse is not displaced  Heart tones are crisp and normal. regular S1 and S2.   Murmurs: None   Jugular venous pulsation Normal  Thre is no carotid bruit   Peripheral pulses are symmetrical and full   Abdomen:  No masses or tenderness  Bowel sounds present  Extremities:   No Cyanosis or Clubbing   Lower extremity edema: No edema    Skin: Warm and dry  Neurological:  Not done     DATA:    Diagnostics:      EKG: normal sinus rhythm, normal ecg         Previous cardiac testing:     None     Labs:     CBC:   Recent Labs     04/22/24  1759 04/23/24  0629   WBC 5.0 4.2   HGB 14.5 14.2   HCT 42.3 41.4    150     BMP:   Recent Labs     04/22/24  1759 04/23/24  0629    140   K 3.8 3.9   CO2 22 21   BUN 18 15   CREATININE 0.9 0.8   LABGLOM >90 >90   GLUCOSE 100* 111*     BNP: No results for input(s): \"BNP\" in the last 72 hours.  PT/INR:   Recent Labs     04/22/24  1759 04/23/24  1606   PROTIME 13.1 13.3   INR 1.0 1.0     APTT:  Recent Labs     04/22/24  1759 04/23/24  1606   APTT 24.6 25.3     CARDIAC ENZYMES:  Recent Labs     04/22/24  1759   CKTOTAL 130     FASTING LIPID PANEL:  Lab Results   Component Value Date/Time    HDL 44 04/23/2024 06:29 AM    TRIG 83 04/23/2024 06:29 AM     LIVER PROFILE:No results for input(s): \"AST\", \"ALT\", \"LABALBU\" in the 
both questions correctly  1c.  Level of consciousness questions:  0 - performs both tasks correctly  2.    Best Gaze:  0 - normal  3.    Visual:  0 - no visual loss  4.    Facial Palsy:  1 - minor paralysis (flattened nasolabial fold, asymmetric on smiling)  5a.  Motor left arm:  0 - no drift, limb holds 90 (or 45) degrees for full 10 seconds  5b.  Motor right arm:  4 - no movement  6a.  Motor left le - no drift; leg holds 30 degree position for full 5 seconds  6b.  Motor right le - no movement  7.    Limb Ataxia:  0 - absent  8.    Sensory:  0 - normal; no sensory loss  9.    Best Language:  1 - mild to moderate aphasia; some obvious loss of fluency or facility of comprehension without significant limitation on ideas expressed or form of expression.  Reduction of speech and/or comprehension, however, makes conversation about provided materials difficult or impossible.  For example, in conversation about provided materials, examiner can identify picture or naming card content from patient's response.   10.  Dysarthria:  1 - mild to moderate, patient slurs at least some words and at worst, can be understood with some difficulty  11.  Extinction and Inattention:  0 - no abnormality    TOTAL:  12   Modified Pablo Score Scale (before stroke):   [] Zero: No symptoms at all   [] 1: No significant disability despite symptoms; able to carry out all usual duties and activities   [] 2: Slight disability; unable to carry out all previous activities, but able to look after own affairs without assistance   [] 3:Moderate disability; requiring some help, but able to walk without assistance   [] 4: Moderately severe disability; unable to walk and attend to bodily needs without assistance   [] 5:Severe disability; bedridden, incontinent and requiring constant nursing care and attention        LABS AND IMAGING:   CBC with Differential:    Lab Results   Component Value Date/Time    WBC 5.0 2024 05:59 PM    RBC 4.51

## 2024-04-25 NOTE — CARE COORDINATION
Discharge Report    [facility]  Clinical Case Management Department  Written by: Yanci Brown RN    Patient Name: Jordi Lott  Attending Provider: No att. providers found  Admit Date: 2024  5:26 PM  MRN: [id number]  Account: [id number]                     : 1975  Discharge Date: 2024      Disposition: home    Yanci Brown RN

## 2024-04-25 NOTE — PLAN OF CARE
Problem: Discharge Planning  Goal: Discharge to home or other facility with appropriate resources  Outcome: Progressing     Problem: Neurosensory - Adult  Goal: Achieves maximal functionality and self care  Outcome: Progressing     Problem: Respiratory - Adult  Goal: Achieves optimal ventilation and oxygenation  Outcome: Progressing     Problem: Cardiovascular - Adult  Goal: Maintains optimal cardiac output and hemodynamic stability  Outcome: Progressing  Goal: Absence of cardiac dysrhythmias or at baseline  Outcome: Progressing     Problem: Musculoskeletal - Adult  Goal: Return mobility to safest level of function  Outcome: Progressing     
  Problem: Discharge Planning  Goal: Discharge to home or other facility with appropriate resources  Outcome: Progressing  Flowsheets  Taken 4/24/2024 0400  Discharge to home or other facility with appropriate resources:   Identify barriers to discharge with patient and caregiver   Arrange for needed discharge resources and transportation as appropriate   Identify discharge learning needs (meds, wound care, etc)   Refer to discharge planning if patient needs post-hospital services based on physician order or complex needs related to functional status, cognitive ability or social support system  Taken 4/24/2024 0000  Discharge to home or other facility with appropriate resources:   Identify barriers to discharge with patient and caregiver   Arrange for needed discharge resources and transportation as appropriate   Identify discharge learning needs (meds, wound care, etc)   Refer to discharge planning if patient needs post-hospital services based on physician order or complex needs related to functional status, cognitive ability or social support system  Taken 4/23/2024 2000  Discharge to home or other facility with appropriate resources:   Identify barriers to discharge with patient and caregiver   Arrange for needed discharge resources and transportation as appropriate   Identify discharge learning needs (meds, wound care, etc)   Refer to discharge planning if patient needs post-hospital services based on physician order or complex needs related to functional status, cognitive ability or social support system     Problem: Neurosensory - Adult  Goal: Achieves maximal functionality and self care  Outcome: Progressing  Flowsheets  Taken 4/24/2024 0400  Achieves maximal functionality and self care:   Monitor swallowing and airway patency with patient fatigue and changes in neurological status   Encourage and assist patient to increase activity and self care with guidance from physical therapy/occupational therapy   
  Problem: Discharge Planning  Goal: Discharge to home or other facility with appropriate resources  Outcome: Progressing  Flowsheets (Taken 4/23/2024 0800)  Discharge to home or other facility with appropriate resources:   Identify barriers to discharge with patient and caregiver   Identify discharge learning needs (meds, wound care, etc)   Refer to discharge planning if patient needs post-hospital services based on physician order or complex needs related to functional status, cognitive ability or social support system     
Cardiac consult noted for nedra and ILR for cryptogenic cva. Will schedule tomorrow am. NPO from midnight. Full consult to follow.   
limits  Outcome: Completed  Goal: Hemodynamic stability and optimal renal function maintained  Outcome: Completed  Goal: Glucose maintained within prescribed range  Outcome: Completed     Problem: Hematologic - Adult  Goal: Maintains hematologic stability  Outcome: Completed     Problem: Chronic Conditions and Co-morbidities  Goal: Patient's chronic conditions and co-morbidity symptoms are monitored and maintained or improved  Outcome: Completed     Problem: Safety - Adult  Goal: Free from fall injury  Outcome: Completed     Problem: ABCDS Injury Assessment  Goal: Absence of physical injury  Outcome: Completed

## 2024-04-26 LAB
APCR PPP: 1.44 {RATIO}
B2 GLYCOPROT1 IGA SERPL IA-ACNC: 1.8 ELISA U/ML (ref 0–7)
B2 GLYCOPROT1 IGG SERPL IA-ACNC: <0.6 ELISA U/ML (ref 0–7)
B2 GLYCOPROT1 IGM SERPL IA-ACNC: <0.9 ELISA U/ML (ref 0–7)
CARDIOLIPIN IGA SER IA-ACNC: 2.7 APL (ref 0–14)
CARDIOLIPIN IGG SER IA-ACNC: 1.9 GPL (ref 0–10)
CARDIOLIPIN IGM SER IA-ACNC: 1 MPL (ref 0–10)
DILUTE RUSSELL VIPER VENOM TIME: NORMAL
F2 C.20210G>A GENO BLD/T: NEGATIVE
F5 P.R506Q BLD/T QL: ABNORMAL
FIBRINOGEN ANTIGEN: 252 MG/DL (ref 149–353)
FIBRINOGEN RATIO: 1.05 RATIO (ref 0.59–1.23)
FIBRINOGEN: 240 MG/DL (ref 150–430)
INR PPP: 1
MTHFR INTERPRETATION: NORMAL
MTHFR MUTATION A1286C: NEGATIVE
MTHFR MUTATION C665T: NORMAL
PARTIAL THROMBOPLASTIN TIME: 25.3 SEC (ref 23–36.5)
PROT S FREE AG ACT/NOR PPP IA: 119 % (ref 74–147)
PROTHROMBIN TIME: 13.3 SEC (ref 11.7–14.9)
SPECIMEN SOURCE: ABNORMAL
SPECIMEN SOURCE: NORMAL
SPECIMEN: NORMAL

## 2024-04-26 NOTE — PROGRESS NOTES
Pharmacy Note     Enoxaparin Dose Adjustment    Jordi Lott is a 48 y.o. male. Pharmacist assessment of enoxaparin dose for VTE prophylaxis.    Recent Labs     04/22/24  1759   BUN 18       Recent Labs     04/22/24  1730 04/22/24  1759   CREATININE 0.8 0.9       Estimated Creatinine Clearance: 128 mL/min (based on SCr of 0.9 mg/dL).  Estimated CrCl using Ideal Body Weight: 104 mL/min (based on IBW 73 kg)    Height:   Ht Readings from Last 1 Encounters:   04/22/24 1.778 m (5' 10\")     Weight:  Wt Readings from Last 1 Encounters:   04/22/24 113.4 kg (250 lb)       The following enoxaparin dose has been adjusted based upon renal function and/or patient weight per P&T Guidelines:             Enoxaparin 40 mg subcutaneously once daily changed to Enoxaparin 30 mg subcutaneously twice daily.    
    Endovascular Neurosurgery  Progress Note      Reason for evaluation: stroke management    SUBJECTIVE:     Doing well no complaint    History of Chief Complaint:    48 year man who was brought to ED by EMS due to sudden onset right-sided weakness on 4/23/24.  Patient was not seen by station when he suddenly felt dizzy and weak on the right side, he did not fall because his coworker caught him.      CTA is negative for LVO. TNK was administered at 5:45 pm and patient was then admitted to the neuro ICU for further monitoring and post TNK management. On arrival to the neuro ICU patient was noted to have completed resolution of symptoms, NIH 0 on re-examination.     SHAUNA found to have PFO, endovascular consulted for stroke recommendation.      Review of Systems:  CONSTITUTIONAL:  negative for fevers, chills, fatigue and malaise    EYES:  negative for double vision, blurred vision and photophobia     HEENT:  negative for tinnitus, epistaxis and sore throat    RESPIRATORY:  negative for cough, shortness of breath, wheezing    CARDIOVASCULAR:  negative for chest pain, palpitations, syncope, edema    GASTROINTESTINAL:  negative for nausea, vomiting    GENITOURINARY:  negative for incontinence    MUSCULOSKELETAL:  negative for neck or back pain    NEUROLOGICAL:  Negative for weakness and tingling  negative for headaches and dizziness    PSYCHIATRIC:  negative for anxiety      Review of systems otherwise negative.      OBJECTIVE:     Vitals:    04/25/24 0859   BP:    Pulse: 59   Resp:    Temp:    SpO2:         General:  Gen: normal habitus, NAD  HEENT: NCAT, mucosa moist  Cvs: RRR, S1 S2 normal  Resp: symmetric unlabored breathing  Abd: s/nd/nt  Ext: no edema  Skin: no lesions seen, warm and dry    Neuro:  Gen: awake and alert, oriented x3.   Lang/speech: no aphasia or dysarthria. Follows commands.  CN: PERRL, EOMI, VFF, V1-3 intact, face symmetric, hearing intact, shoulder shrug symmetric, tongue midline  Motor: grossly 
   Daily Progress Note  Neuro Critical Care    Patient Name: Jordi Hollingsworth : 1975  Room/Bed: 0119/0119-01  Code Status: Full  Allergies: No Known Allergies    CHIEF COMPLAINT:      Right sided weakness      INTERVAL HISTORY    Initial Presentation (Admitted 2024):    The patient is a 48 y.o. male with no known PMH who was brought to ED by EMS due to sudden onset right-sided weakness.  Patient was not seen by station when he suddenly felt dizzy and weak on the right side, he did not fall because his coworker caught him.  Upon arrival to the hospital he was taken directly to CT scanner, he was noted to have right-sided facial droop, right arm and leg are completely plegic, mild dysarthria and loss of speech fluency.  CT scan did not show any signs of bleeding, CTA is negative for LVO. TNK was administered at 5:45 pm and patient was then admitted to the neuro ICU for further monitoring and post TNK management. On arrival to the neuro ICU patient was noted to have completed resolution of symptoms, NIH 0 on re-examination.       Hospital Course:   : As above     Interval Events:   No acute events overnight.  Patient examined at bedside, resting comfortably in bed, alert and oriented x 3, no focal neurological deficit, reports interval improvement in symptoms post TNK administration.  Current NIH 0.  Patient denies any history of stroke, high blood pressure, diabetes, heart disease, palpitations, arrhythmia, however does endorse a family history of stroke and reports that his father has had multiple strokes onset 55 years old.  MRI brain shows subacute ischemia involving the left basal ganglia and left parietal lobe.  Plan today to consult cardiology for SHAUNA and loop recorder, send hypercoagulable workup, and transfer out of the ICU.     CURRENT MEDICATIONS:  SCHEDULED MEDICATIONS:   traZODone  100 mg Oral Nightly    sodium chloride flush  5-40 mL IntraVENous 2 times per day    sodium chloride flush  
1015--Pt in MRI, tolerating well. RN accompanying     1100--Back from Mri, went well.     1600--MD ordered q4 checks, and transfer orders, aware of pt TNK timeframe  
CLINICAL PHARMACY NOTE: MEDS TO BEDS    Total # of Prescriptions Filled: 3   The following medications were delivered to the patient:  Aspirin  Rosuvastatin  clopidogrel    Additional Documentation:    
[facility]   IN-PATIENT SERVICE   [facility]    Progress Note             Date:   4/25/2024  Patient name:  Jordi Lott  Date of admission:  4/22/2024  5:26 PM  MRN:   [id number]  Account:  [id number]  YOB: 1975  PCP:    Gerry Sarabia DO  Room:   [address] [place] [address]  Code Status:    Full Code    Chief Complaint:     Chief Complaint   Patient presents with    Cerebrovascular Accident       Interval hx:     The patient was seen and examined at bedside. No acute events overnight.  Patient has no focal neurological deficits and he has returned to his baseline      Brief History of Present Illness:     The patient is a 48 y.o.  Non- / non  male with no known PMH who was brought to ED by EMS due to sudden onset right-sided weakness. Patient was not seen by station when he suddenly felt dizzy and weak on the right side, he did not fall because his coworker caught him. Upon arrival to the hospital he was taken directly to CT scanner, he was noted to have right-sided facial droop, right arm and leg are completely plegic, mild dysarthria and loss of speech fluency. CT scan did not show any signs of bleeding, CTA is negative for LVO. TNK was administered at 5:45 pm and patient was then admitted to the neuro ICU for further monitoring and post TNK management. On arrival to the neuro ICU patient was noted to have completed resolution of symptoms, NIH 0 on re-examination.         Past Medical History:     History reviewed. No pertinent past medical history.     Past Surgical History:     No past surgical history on file.     Medications Prior to Admission:     Prior to Admission medications    Medication Sig Start Date End Date Taking? Authorizing Provider   traZODone (DESYREL) 100 MG tablet Take 1 tablet by mouth nightly 10/19/18  Yes Provider, MD Abi        Allergies:     Patient has no known allergies.    Social History:     Tobacco:    has no history on file for 
Occupational Therapy  Facility/Department: [address] NEURO ICU  Occupational Therapy Initial Assessment    Name: Jordi Lott  : 1975  MRN: [id number]  Date of Service: 2024    Discharge Recommendations:   No therapy recommended at discharge.      OT Equipment Recommendations  Equipment Needed: No       Patient Diagnosis(es): The primary encounter diagnosis was Cerebrovascular accident (CVA), unspecified mechanism (HCC). A diagnosis of Ischemic stroke (HCC) was also pertinent to this visit.  Past Medical History:  has no past medical history on file.  Past Surgical History:  has no past surgical history on file.           Assessment   Assessment: Pt agreed to OT eval this date. Pt currently demonstrates no acute performance deficits and is completing all ADLs/IADLs and functional mobility independently. Pt will be discharged from OT services as there are no goals identified and no needs to address. Pt ed on s/s stroke and \"FAST\" with good return. Pt reports no concerns with returning to prior living arrangements and completing all ADLs/IADLs independently upon discharge. Please reorder OT if future needs arise.  Prognosis: Good  Decision Making: Low Complexity  No Skilled OT: Independent with ADL's;No OT goals identified;Independent with functional mobility  REQUIRES OT FOLLOW-UP: No  Activity Tolerance  Activity Tolerance: Patient Tolerated treatment well          Restrictions  Restrictions/Precautions  Restrictions/Precautions: Fall Risk  Required Braces or Orthoses?: No  Position Activity Restriction  Other position/activity restrictions: s/p TNK 2024 at 1745    Subjective   General  Patient assessed for rehabilitation services?: Yes  Family / Caregiver Present: Yes (wife present)  General Comment  Comments: RN ok'd pt for OT this date. Pt agreed to session and was pleasant/cooperative throughout. Pt denies pain    Social/Functional History  Social/Functional History  Lives With: Spouse  Type of Home: 
Patient admitted, consent signed and questions answered. Patient ready for procedure. Call light to reach with side rails up 2 of 2.   Family at bedside with patient.  History and physical complete.  
SLP ALL NOTES  Facility/Department: [address] NEURO ICU  Initial Speech/Language/Cognitive Assessment    NAME: Jordi Lott  : 1975   MRN: [id number]  ADMISSION DATE: 2024  ADMITTING DIAGNOSIS: has Acute ischemic left middle cerebral artery (MCA) stroke (HCC) and Acute right hemiparesis (HCC) on their problem list.    Date of Eval: 2024   Evaluating Therapist: HELENA River    RECENT RESULTS  CT OF HEAD/MRI: 24  24 hours post thrombolytic agent  Reason for Exam: 24 hours post thrombolytic agent     FINDINGS:  BRAIN/VENTRICLES: There is no acute intracranial hemorrhage, mass effect or  midline shift.  No abnormal extra-axial fluid collection.  The gray-white  differentiation is maintained without evidence of an acute infarct except for  subtle abnormality left posterior parietal cortex.  There is no evidence of  hydrocephalus.     ORBITS: The visualized portion of the orbits demonstrate no acute abnormality.     SINUSES: The visualized paranasal sinuses and mastoid air cells demonstrate  no acute abnormality.     SOFT TISSUES/SKULL:  No acute abnormality of the visualized skull or soft  tissues.     IMPRESSION:  Evolving known subacute infarct left posterior parietal cortex.  No evidence  of hemorrhage.    Primary Complaint: The patient is a 48 y.o. male presented with right-sided weakness of right arm and leg last known well 20 minutes prior to arrival. Onset of symptoms 530 pm right facial/arm/leg weakness right arm and leg weakness.  Patient symptoms persisted upon arrival to the emergency department.  Patient was given TNK given at 1745 with resolution of his symptoms.  Upon my evaluation patient negative neurodeficits.     Pt takes trazadone.    Pain:  Pain Assessment  Pain Assessment: None - Denies Pain    Vision/ Hearing  Hearing  Hearing: Within functional limits    Assessment:    Pt presents with mild cognitive deficits characterized by difficulties with delayed recall, though 
SLP ALL NOTES  Speech Language Pathology  [facility]    Cognitive Treatment Note    Date: 4/25/2024  Patient’s Name: Jordi Lott  MRN: [id number]  Diagnosis:   Patient Active Problem List   Diagnosis Code    Acute ischemic left middle cerebral artery (MCA) stroke ([facility]) I63.512    Acute right hemiparesis ([facility]) G81.91       Pain: 0/10    Cognitive Treatment    Treatment time: 9556-2137      Subjective: [x] Alert [x] Cooperative     [] Confused     [] Agitated    [] Lethargic      Objective/Assessment:      Recall: Delayed recall 3 unrelated units: 3/3 independently, 2/3 increased to 3/3 with mod verbal cue, 3/3 independently    Pt. Provided with education regarding memory compensatory strategies. Pt. Encouraged to utilize strategies once discharged from the hospital. Pt. Verbalized understanding.        Problem Solving/Reasoning: Similarities and differences: 10/12 increased to 12/12 with min verbal cues    Inferences about paragraphs: 7/10 increased to 10/10 with min verbal cues    Word deductions 14/15 increased to 15/15 with min verbal cue    Other: Pt reports he does not have the best memory at baseline but does implement memory aids/strategies at home. He feels his cognitive linguistic skills are at baseline. Discussed following up with OP ST if he experiences any deficits post discharge at home.     Plan:  [x] Continue ST services    [] Discharge from ST:      Discharge recommendations: []  Further therapy recommended at discharge.The patient should be able to tolerate at least 3 hours of therapy per day over 5 days or 15 hours over 7 days. [x] Further therapy recommended at discharge.   [] No therapy recommended at discharge.          Treatment completed by: Kelli Weldon, SLP, M.S. [facility]-SLP    
moving from lying on your back to sitting on the side of a flat bed without using bedrails?: None  How much help is needed moving to and from a bed to a chair?: None  How much help is needed standing up from a chair using your arms?: None  How much help is needed walking in hospital room?: None  How much help is needed climbing 3-5 steps with a railing?: None  AM-PAC Inpatient Mobility Raw Score : 24  AM-PAC Inpatient T-Scale Score : 61.14  Mobility Inpatient CMS 0-100% Score: 0  Mobility Inpatient CMS G-Code Modifier : CH        Goals  Patient Goals   Patient Goals : To get back to work       Education  Patient Education  Education Given To: Patient  Education Provided: Role of Therapy;Plan of Care  Education Method: Verbal  Barriers to Learning: None  Education Outcome: Verbalized understanding      Therapy Time   Individual Concurrent Group Co-treatment   Time In 1040         Time Out 1058         Minutes 18         Timed Code Treatment Minutes: 8 Minutes       Yanci Ramirez, PT

## 2024-04-27 PROBLEM — Q21.12 PFO (PATENT FORAMEN OVALE): Status: ACTIVE | Noted: 2024-04-27

## 2024-04-30 LAB
ANTIPHOSPHATIDYLSERINE IGA ANTIBODY: 0 APS (ref 0–19)
ANTIPHOSPHATIDYLSERINE IGG ANTIBODY: 2 GPS (ref 0–15)
ANTIPHOSPHATIDYLSERINE IGM ANTIBODY: 2 MPS (ref 0–21)

## 2024-05-03 DIAGNOSIS — I63.9 ISCHEMIC STROKE (HCC): Primary | ICD-10-CM

## 2024-05-03 DIAGNOSIS — D68.51 FACTOR V LEIDEN (HCC): ICD-10-CM

## 2024-05-07 ENCOUNTER — TELEPHONE (OUTPATIENT)
Dept: CRITICAL CARE MEDICINE | Age: 49
End: 2024-05-07

## 2024-05-07 NOTE — TELEPHONE ENCOUNTER
Notified patient of hypercoag laboratory results, discussed recommendation to follow up with hematology, referral sent to [facility] Hematology/oncology.

## (undated) DEVICE — SUTURE VICRYL 2-0 L27IN ABSRB CT BRAID COAT UD J275H

## (undated) DEVICE — STRAP ARMBRD W1.5XL32IN FOAM STR YET SFT W/ HK AND LOOP

## (undated) DEVICE — MEDI-TRACE CADENCE ADULT, DEFIBRILLATION ELECTRODE -RTS  (10 PR/PK) - PHYSIO-CONTROL: Brand: MEDI-TRACE CADENCE

## (undated) DEVICE — SPONGE LAP W18XL18IN WHT COT 4 PLY FLD STRUNG RADPQ DISP ST 2 PER PACK

## (undated) DEVICE — Device

## (undated) DEVICE — ELECTRODE PT RET AD L9FT HI MOIST COND ADH HYDRGEL CORDED

## (undated) DEVICE — Z DISCONTINUED USE 2859063 SUTURE VICRYL 3-0 L27IN ABSRB UD PSL L30MM 1/2 CIR TAPERPOINT J502H

## (undated) DEVICE — KIT MICRO INTRO 4FR STIFF 40CM NIGHTENALL TUNGSTEN 7SMT